# Patient Record
Sex: FEMALE | Race: WHITE | NOT HISPANIC OR LATINO | URBAN - METROPOLITAN AREA
[De-identification: names, ages, dates, MRNs, and addresses within clinical notes are randomized per-mention and may not be internally consistent; named-entity substitution may affect disease eponyms.]

---

## 2019-11-05 ENCOUNTER — APPOINTMENT (RX ONLY)
Dept: URBAN - METROPOLITAN AREA CLINIC 158 | Facility: CLINIC | Age: 59
Setting detail: DERMATOLOGY
End: 2019-11-05

## 2019-11-05 DIAGNOSIS — L72.0 EPIDERMAL CYST: ICD-10-CM

## 2019-11-05 PROBLEM — K21.9 GASTRO-ESOPHAGEAL REFLUX DISEASE WITHOUT ESOPHAGITIS: Status: ACTIVE | Noted: 2019-11-05

## 2019-11-05 PROBLEM — E03.9 HYPOTHYROIDISM, UNSPECIFIED: Status: ACTIVE | Noted: 2019-11-05

## 2019-11-05 PROBLEM — I10 ESSENTIAL (PRIMARY) HYPERTENSION: Status: ACTIVE | Noted: 2019-11-05

## 2019-11-05 PROBLEM — J44.9 CHRONIC OBSTRUCTIVE PULMONARY DISEASE, UNSPECIFIED: Status: ACTIVE | Noted: 2019-11-05

## 2019-11-05 PROBLEM — E78.5 HYPERLIPIDEMIA, UNSPECIFIED: Status: ACTIVE | Noted: 2019-11-05

## 2019-11-05 PROBLEM — J45.909 UNSPECIFIED ASTHMA, UNCOMPLICATED: Status: ACTIVE | Noted: 2019-11-05

## 2019-11-05 PROBLEM — M12.9 ARTHROPATHY, UNSPECIFIED: Status: ACTIVE | Noted: 2019-11-05

## 2019-11-05 PROCEDURE — 11900 INJECT SKIN LESIONS </W 7: CPT

## 2019-11-05 PROCEDURE — ? INTRALESIONAL KENALOG

## 2019-11-05 PROCEDURE — ? COUNSELING

## 2019-11-05 PROCEDURE — ? INCISION AND DRAINAGE

## 2019-11-05 PROCEDURE — 10060 I&D ABSCESS SIMPLE/SINGLE: CPT

## 2019-11-05 PROCEDURE — ? PRESCRIPTION

## 2019-11-05 RX ORDER — DOXYCYCLINE HYCLATE 100 MG/1
CAPSULE, GELATIN COATED ORAL
Qty: 14 | Refills: 0 | Status: ERX | COMMUNITY
Start: 2019-11-05

## 2019-11-05 RX ADMIN — DOXYCYCLINE HYCLATE: 100 CAPSULE, GELATIN COATED ORAL at 22:41

## 2019-11-05 ASSESSMENT — LOCATION DETAILED DESCRIPTION DERM: LOCATION DETAILED: MIDDLE STERNUM

## 2019-11-05 ASSESSMENT — LOCATION SIMPLE DESCRIPTION DERM: LOCATION SIMPLE: CHEST

## 2019-11-05 ASSESSMENT — LOCATION ZONE DERM: LOCATION ZONE: TRUNK

## 2019-11-05 NOTE — PROCEDURE: MIPS QUALITY
Quality 474: Zoster Vaccination Status: Shingrix Vaccination not Administered or Previously Received, Reason not Otherwise Specified
Quality 110: Preventive Care And Screening: Influenza Immunization: Influenza immunization was not ordered or administered, reason not given
Detail Level: Detailed
Quality 131: Pain Assessment And Follow-Up: Pain assessment documented as positive using a standardized tool AND a follow-up plan is documented
Quality 111:Pneumonia Vaccination Status For Older Adults: Pneumococcal Vaccination Previously Received
Quality 226: Preventive Care And Screening: Tobacco Use: Screening And Cessation Intervention: Patient screened for tobacco use and is an ex/non-smoker

## 2019-11-05 NOTE — PROCEDURE: INCISION AND DRAINAGE
Dressing: dry sterile dressing
Curette Text (Optional): After the contents were expressed a curette was used to partially remove the cyst wall.
Size Of Lesion In Cm (Optional But May Be Required For Some Insurances): 4.5
Wound Care: Bacitracin
Include Sutures?: No
Suture Text: The incision was partially closed with
Epidermal Closure: simple interrupted
Consent was obtained and risks were reviewed including but not limited to delayed wound healing, infection, need for multiple I and D's, and pain.
Preparation Text: The area was prepped in the usual clean fashion.
Method: 11 blade
Epidermal Sutures: 4-0 Ethilon
Lesion Type: Abscess
Anesthesia Volume In Cc: 1
Detail Level: Simple
Post-Care Instructions: I reviewed with the patient in detail post-care instructions. Patient should keep wound covered and call the office should any redness, pain, swelling or worsening occur.
Anesthesia Type: 2% lidocaine with epinephrine

## 2019-11-05 NOTE — PROCEDURE: INTRALESIONAL KENALOG
Include Z78.9 (Other Specified Conditions Influencing Health Status) As An Associated Diagnosis?: No
Kenalog Preparation: Kenalog
Detail Level: Detailed
Consent: The risks of atrophy were reviewed with the patient.
X Size Of Lesion In Cm (Optional): 0
Total Volume Injected (Ccs- Only Use Numbers And Decimals): 0.7
Medical Necessity Clause: This procedure was medically necessary because the lesions that were treated were:
Administered By (Optional): Dr. Markle
Concentration Of Solution Injected (Mg/Ml): 2.5